# Patient Record
Sex: FEMALE | Race: WHITE | NOT HISPANIC OR LATINO | Employment: FULL TIME | URBAN - METROPOLITAN AREA
[De-identification: names, ages, dates, MRNs, and addresses within clinical notes are randomized per-mention and may not be internally consistent; named-entity substitution may affect disease eponyms.]

---

## 2017-08-14 ENCOUNTER — OFFICE VISIT (OUTPATIENT)
Dept: OBSTETRICS AND GYNECOLOGY | Facility: CLINIC | Age: 27
End: 2017-08-14

## 2017-08-14 VITALS
WEIGHT: 211 LBS | BODY MASS INDEX: 37.39 KG/M2 | DIASTOLIC BLOOD PRESSURE: 84 MMHG | HEIGHT: 63 IN | SYSTOLIC BLOOD PRESSURE: 126 MMHG

## 2017-08-14 DIAGNOSIS — Z13.9 SCREENING: ICD-10-CM

## 2017-08-14 DIAGNOSIS — F32.89 OTHER DEPRESSION: ICD-10-CM

## 2017-08-14 DIAGNOSIS — Z30.09 ENCOUNTER FOR OTHER GENERAL COUNSELING OR ADVICE ON CONTRACEPTION: Primary | ICD-10-CM

## 2017-08-14 PROBLEM — F32.A DEPRESSION: Status: ACTIVE | Noted: 2017-08-14

## 2017-08-14 LAB
B-HCG UR QL: NEGATIVE
BILIRUB BLD-MCNC: ABNORMAL MG/DL
CLARITY, POC: CLEAR
COLOR UR: YELLOW
GLUCOSE UR STRIP-MCNC: NEGATIVE MG/DL
INTERNAL NEGATIVE CONTROL: NEGATIVE
INTERNAL POSITIVE CONTROL: POSITIVE
KETONES UR QL: NEGATIVE
LEUKOCYTE EST, POC: ABNORMAL
Lab: NORMAL
NITRITE UR-MCNC: NEGATIVE MG/ML
PH UR: 5.5 [PH] (ref 5–8)
PROT UR STRIP-MCNC: ABNORMAL MG/DL
RBC # UR STRIP: NEGATIVE /UL
SP GR UR: 1.03 (ref 1–1.03)
UROBILINOGEN UR QL: NORMAL

## 2017-08-14 PROCEDURE — 81002 URINALYSIS NONAUTO W/O SCOPE: CPT | Performed by: OBSTETRICS & GYNECOLOGY

## 2017-08-14 PROCEDURE — 81025 URINE PREGNANCY TEST: CPT | Performed by: OBSTETRICS & GYNECOLOGY

## 2017-08-14 PROCEDURE — 99203 OFFICE O/P NEW LOW 30 MIN: CPT | Performed by: OBSTETRICS & GYNECOLOGY

## 2017-08-14 RX ORDER — FLUOXETINE HYDROCHLORIDE 20 MG/1
20 CAPSULE ORAL DAILY
Qty: 30 CAPSULE | Refills: 5 | Status: SHIPPED | OUTPATIENT
Start: 2017-08-14 | End: 2017-10-24

## 2017-08-14 NOTE — PROGRESS NOTES
"Subjective   Mayte RAMOS is a 26 y.o. female new pt, signed records release in office today and we are waiting to receive them. Has been on Depo Provera, doesn't feel cycles were normal. C/o alot of spotting. - patient has been on Depo last 6 months - wants a different BC - Discussed all options - will try Kyleena - Had pap in jan 2017 - also recently diagnosed with depression - seeing a therapist - would like to try anti-depressant   History of Present Illness    The following portions of the patient's history were reviewed and updated as appropriate: allergies, current medications, past family history, past medical history, past social history, past surgical history and problem list.    Review of Systems   Constitutional: Negative for chills and fever.   Gastrointestinal: Negative for abdominal distention and abdominal pain.   Genitourinary: Positive for menstrual problem and vaginal bleeding. Negative for dysuria, pelvic pain, vaginal discharge and vaginal pain.   Psychiatric/Behavioral: Positive for dysphoric mood. Negative for suicidal ideas. The patient is nervous/anxious.    All other systems reviewed and are negative.  /84  Ht 63\" (160 cm)  Wt 211 lb (95.7 kg)  LMP  (LMP Unknown)  Breastfeeding? No  BMI 37.38 kg/m2      Objective   Physical Exam   Constitutional: She is oriented to person, place, and time. She appears well-developed and well-nourished.   HENT:   Head: Normocephalic and atraumatic.   Pulmonary/Chest: Effort normal.   Neurological: She is alert and oriented to person, place, and time.   Skin: Skin is warm and dry.   Psychiatric: She has a normal mood and affect. Her behavior is normal.   Nursing note and vitals reviewed.        Assessment/Plan   Mayte Browning was seen today for establish care.    Diagnoses and all orders for this visit:    Encounter for other general counseling or advice on contraception    Screening  -     POC Urinalysis Dipstick  -     POC Pregnancy, " Urine    Other depression  -     FLUoxetine (PROZAC) 20 MG capsule; Take 1 capsule by mouth Daily.      Return for kyleena placement   Counseling was given to patient for the following topics: risks and benefits of treatment options and contraceptive options and side effects and depression tx . Total time of the encounter was 30 minutes and 20 minutes was spend counseling.

## 2017-08-14 NOTE — PROGRESS NOTES
Subjective   Mayte RAMOS is a 26 y.o. female new pt, signed records release in office today and we are waiting to receive them. Has been on Depo Provera, doesn't feel cycles were normal. C/o alot of spotting.       History of Present Illness    The following portions of the patient's history were reviewed and updated as appropriate: allergies, current medications, past family history, past medical history, past social history, past surgical history and problem list.    Review of Systems    Objective   Physical Exam      Assessment/Plan   Mayte Browning was seen today for establish care.    Diagnoses and all orders for this visit:    Screening  -     POC Urinalysis Dipstick  -     POC Pregnancy, Urine

## 2017-09-19 ENCOUNTER — PROCEDURE VISIT (OUTPATIENT)
Dept: OBSTETRICS AND GYNECOLOGY | Facility: CLINIC | Age: 27
End: 2017-09-19

## 2017-09-19 VITALS
DIASTOLIC BLOOD PRESSURE: 76 MMHG | SYSTOLIC BLOOD PRESSURE: 118 MMHG | BODY MASS INDEX: 37.03 KG/M2 | WEIGHT: 209 LBS | HEIGHT: 63 IN

## 2017-09-19 DIAGNOSIS — B96.89 BV (BACTERIAL VAGINOSIS): ICD-10-CM

## 2017-09-19 DIAGNOSIS — F32.89 OTHER DEPRESSION: ICD-10-CM

## 2017-09-19 DIAGNOSIS — N76.0 BV (BACTERIAL VAGINOSIS): ICD-10-CM

## 2017-09-19 DIAGNOSIS — Z13.9 SCREENING: ICD-10-CM

## 2017-09-19 DIAGNOSIS — Z30.430 ENCOUNTER FOR INSERTION OF INTRAUTERINE CONTRACEPTIVE DEVICE: Primary | ICD-10-CM

## 2017-09-19 PROBLEM — R87.612 LGSIL ON PAP SMEAR OF CERVIX: Status: ACTIVE | Noted: 2017-09-19

## 2017-09-19 LAB
B-HCG UR QL: NEGATIVE
BILIRUB BLD-MCNC: NEGATIVE MG/DL
CLARITY, POC: CLEAR
COLOR UR: YELLOW
GLUCOSE UR STRIP-MCNC: NEGATIVE MG/DL
INTERNAL NEGATIVE CONTROL: NEGATIVE
INTERNAL POSITIVE CONTROL: POSITIVE
KETONES UR QL: NEGATIVE
LEUKOCYTE EST, POC: ABNORMAL
Lab: NORMAL
NITRITE UR-MCNC: NEGATIVE MG/ML
PH UR: 6 [PH] (ref 5–8)
PROT UR STRIP-MCNC: ABNORMAL MG/DL
RBC # UR STRIP: NEGATIVE /UL
SP GR UR: 1.02 (ref 1–1.03)
UROBILINOGEN UR QL: NORMAL

## 2017-09-19 PROCEDURE — 81025 URINE PREGNANCY TEST: CPT | Performed by: OBSTETRICS & GYNECOLOGY

## 2017-09-19 PROCEDURE — 99212 OFFICE O/P EST SF 10 MIN: CPT | Performed by: OBSTETRICS & GYNECOLOGY

## 2017-09-19 PROCEDURE — 81002 URINALYSIS NONAUTO W/O SCOPE: CPT | Performed by: OBSTETRICS & GYNECOLOGY

## 2017-09-19 PROCEDURE — 58300 INSERT INTRAUTERINE DEVICE: CPT | Performed by: OBSTETRICS & GYNECOLOGY

## 2017-09-19 NOTE — PROGRESS NOTES
Procedure   Procedures    IUD Insertion Procedure Note    Pre-operative Diagnosis: Contraception     Post-operative Diagnosis: same    Indications: contraception    Procedure Details   Urine pregnancy test was done and result was neg.  The risks (including infection, bleeding, pain, and uterine perforation) and benefits of the procedure were explained to the patient and Written informed consent was obtained.      Cervix cleansed with Betadine. Uterus sounded to 7 cm. IUD inserted without difficulty. String visible and trimmed. Patient tolerated procedure well.    IUD Information:  Lot # [id number], Expiration date 12/18, [place].    Condition:  Stable    Complications:  None    Plan:    The patient was advised to call for any fever or for prolonged or severe pain or bleeding. She was advised to use NSAID as needed for mild to moderate pain.     Attending Physician Documentation:  I was present for or participated in the entire procedure, including opening and closing.

## 2017-09-19 NOTE — PROGRESS NOTES
"Subjective   Mayte Boyce is a 27 y.o. female who presents for kyleena brochure - states prozac is helping but can't sleep - works 3rd shift - told her to switch to night before she goes to work -  Had LSIL pap 1/2017 and colpo showing mild dysplasia in Feb 2017     History of Present Illness    The following portions of the patient's history were reviewed and updated as appropriate: allergies, current medications, past family history, past medical history, past social history, past surgical history and problem list.    Review of Systems   Constitutional: Negative for chills, fatigue and fever.   Gastrointestinal: Negative for abdominal distention and abdominal pain.   Genitourinary: Negative for dyspareunia, dysuria, menstrual problem, pelvic pain, vaginal bleeding, vaginal discharge and vaginal pain.   All other systems reviewed and are negative.  /76  Ht 63\" (160 cm)  Wt 209 lb (94.8 kg)  LMP  (LMP Unknown)  Breastfeeding? No Comment: was using depo  BMI 37.02 kg/m2      Objective   Physical Exam   Constitutional: She is oriented to person, place, and time. She appears well-developed and well-nourished.   Pulmonary/Chest: Effort normal.   Neurological: She is alert and oriented to person, place, and time.   Skin: Skin is warm and dry.   Psychiatric: She has a normal mood and affect. Her behavior is normal.   Nursing note and vitals reviewed.      Assessment/Plan   Mayte Miller was seen today for procedure and follow-up.    Diagnoses and all orders for this visit:    Encounter for insertion of intrauterine contraceptive device  -     levonorgestrel (KYLEENA) 19.5 MG IUD 1 each; 1 each by Intrauterine route 1 (One) Time.    Screening  -     POC Urinalysis Dipstick  -     POC Pregnancy, Urine    Other depression       Return 4 weeks string check          "

## 2017-09-22 ENCOUNTER — TELEPHONE (OUTPATIENT)
Dept: OBSTETRICS AND GYNECOLOGY | Facility: CLINIC | Age: 27
End: 2017-09-22

## 2017-09-22 LAB
A VAGINAE DNA VAG QL NAA+PROBE: ABNORMAL SCORE
BVAB2 DNA VAG QL NAA+PROBE: ABNORMAL SCORE
C ALBICANS DNA VAG QL NAA+PROBE: NEGATIVE
C GLABRATA DNA VAG QL NAA+PROBE: NEGATIVE
C TRACH RRNA SPEC QL NAA+PROBE: NEGATIVE
MEGA1 DNA VAG QL NAA+PROBE: ABNORMAL SCORE
N GONORRHOEA RRNA SPEC QL NAA+PROBE: NEGATIVE
T VAGINALIS RRNA SPEC QL NAA+PROBE: NEGATIVE

## 2017-09-22 RX ORDER — METRONIDAZOLE 500 MG/1
500 TABLET ORAL 2 TIMES DAILY
Qty: 14 TABLET | Refills: 0 | Status: SHIPPED | OUTPATIENT
Start: 2017-09-22 | End: 2017-09-29

## 2017-09-22 NOTE — TELEPHONE ENCOUNTER
----- Message from Elva Henry MD sent at 9/22/2017 11:22 AM EDT -----  Please call patient and notify of BV infection - Rx sent to pharmacy

## 2017-09-22 NOTE — TELEPHONE ENCOUNTER
Left message to  rx at pharmacy, however, need to call back on Monday for more information.  Ask for Rody Rondon.

## 2017-09-25 ENCOUNTER — TELEPHONE (OUTPATIENT)
Dept: OBSTETRICS AND GYNECOLOGY | Facility: CLINIC | Age: 27
End: 2017-09-25

## 2017-10-24 ENCOUNTER — OFFICE VISIT (OUTPATIENT)
Dept: OBSTETRICS AND GYNECOLOGY | Facility: CLINIC | Age: 27
End: 2017-10-24

## 2017-10-24 VITALS
HEIGHT: 63 IN | SYSTOLIC BLOOD PRESSURE: 120 MMHG | DIASTOLIC BLOOD PRESSURE: 82 MMHG | BODY MASS INDEX: 37.21 KG/M2 | WEIGHT: 210 LBS

## 2017-10-24 DIAGNOSIS — Z13.89 SCREENING FOR BLOOD OR PROTEIN IN URINE: ICD-10-CM

## 2017-10-24 DIAGNOSIS — F32.89 OTHER DEPRESSION: ICD-10-CM

## 2017-10-24 DIAGNOSIS — Z30.431 ENCOUNTER FOR ROUTINE CHECKING OF INTRAUTERINE CONTRACEPTIVE DEVICE (IUD): Primary | ICD-10-CM

## 2017-10-24 PROBLEM — R87.612 LGSIL ON PAP SMEAR OF CERVIX: Status: RESOLVED | Noted: 2017-09-19 | Resolved: 2017-10-24

## 2017-10-24 PROBLEM — N87.0 CIN I (CERVICAL INTRAEPITHELIAL NEOPLASIA I): Status: ACTIVE | Noted: 2017-10-24

## 2017-10-24 LAB
BILIRUB BLD-MCNC: ABNORMAL MG/DL
CLARITY, POC: CLEAR
COLOR UR: YELLOW
GLUCOSE UR STRIP-MCNC: NEGATIVE MG/DL
KETONES UR QL: NEGATIVE
LEUKOCYTE EST, POC: ABNORMAL
NITRITE UR-MCNC: NEGATIVE MG/ML
PH UR: 5.5 [PH] (ref 5–8)
PROT UR STRIP-MCNC: ABNORMAL MG/DL
RBC # UR STRIP: ABNORMAL /UL
SP GR UR: 1.02 (ref 1–1.03)
UROBILINOGEN UR QL: NORMAL

## 2017-10-24 PROCEDURE — 99213 OFFICE O/P EST LOW 20 MIN: CPT | Performed by: OBSTETRICS & GYNECOLOGY

## 2017-10-24 PROCEDURE — 81002 URINALYSIS NONAUTO W/O SCOPE: CPT | Performed by: OBSTETRICS & GYNECOLOGY

## 2017-10-24 NOTE — PROGRESS NOTES
"Subjective   Mayte Boyce is a 27 y.o. female Kyleena string check. f/u on depression. Does not feel like prozac is working anymore and would like to try something different  - will switch to zoloft   History of Present Illness    The following portions of the patient's history were reviewed and updated as appropriate: allergies, current medications, past family history, past medical history, past social history, past surgical history and problem list.    Review of Systems   Constitutional: Negative for chills, fatigue and fever.   Gastrointestinal: Negative for abdominal distention and abdominal pain.   Genitourinary: Negative for dysuria, menstrual problem, pelvic pain, vaginal bleeding, vaginal discharge and vaginal pain.   Psychiatric/Behavioral: Positive for agitation and dysphoric mood. Negative for suicidal ideas.   All other systems reviewed and are negative.  /82  Ht 63\" (160 cm)  Wt 210 lb (95.3 kg)  Breastfeeding? No  BMI 37.2 kg/m2      Objective   Physical Exam   Constitutional: She is oriented to person, place, and time. She appears well-developed and well-nourished.   Pulmonary/Chest: Effort normal.   Genitourinary: Vagina normal.   Genitourinary Comments: IUD strings visualized at os    Neurological: She is alert and oriented to person, place, and time.   Skin: Skin is warm and dry.   Psychiatric: She has a normal mood and affect. Her behavior is normal.   Nursing note and vitals reviewed.      Assessment/Plan   Mayte Miller was seen today for follow-up.    Diagnoses and all orders for this visit:    Encounter for routine checking of intrauterine contraceptive device (IUD)    Screening for blood or protein in urine  -     POC Urinalysis Dipstick    Other depression  -     sertraline (ZOLOFT) 50 MG tablet; Take 1 tablet by mouth Daily.                "

## 2018-03-15 ENCOUNTER — OFFICE VISIT (OUTPATIENT)
Dept: OBSTETRICS AND GYNECOLOGY | Age: 28
End: 2018-03-15

## 2018-03-15 VITALS
WEIGHT: 221 LBS | DIASTOLIC BLOOD PRESSURE: 84 MMHG | HEIGHT: 63 IN | SYSTOLIC BLOOD PRESSURE: 120 MMHG | BODY MASS INDEX: 39.16 KG/M2

## 2018-03-15 DIAGNOSIS — Z01.419 WELL WOMAN EXAM WITH ROUTINE GYNECOLOGICAL EXAM: Primary | ICD-10-CM

## 2018-03-15 DIAGNOSIS — N89.8 VAGINAL DISCHARGE: ICD-10-CM

## 2018-03-15 PROCEDURE — 99395 PREV VISIT EST AGE 18-39: CPT | Performed by: NURSE PRACTITIONER

## 2018-03-15 RX ORDER — DIPHENHYDRAMINE HCL 25 MG
25 CAPSULE ORAL NIGHTLY PRN
COMMUNITY
End: 2019-04-18

## 2018-03-15 NOTE — PROGRESS NOTES
[facility]  Routine Annual Visit    3/15/2018    Patient: Mayte Boyce          MR#:[id number]      History of Present Illness    27 y.o. female  who presents for annual exam. Last pap 2017 showed low grade dysplasia- this was followed by lesley which confirmed mild dysplasia only. Pap smear will be repeated today. Mayte Miller is using kyleena for contraception- inserted September. She reports amenorrhea for the most part on the IUD. She has struggled with depression for years and has tried several medications but has never felt depression adequately controlled. Was seeing a therapist but has not for several months. No SI.      No LMP recorded (lmp unknown). Patient has had an implant.  Obstetric History:  OB History      Para Term  AB Living    0 0 0 0 0 0    SAB TAB Ectopic Molar Multiple Live Births    0 0 0  0          Menstrual History:     No LMP recorded (lmp unknown). Patient has had an implant.       Sexual History:       ________________________________________  Patient Active Problem List   Diagnosis   • Depression   • KEAGAN I (cervical intraepithelial neoplasia I)       Past Medical History:   Diagnosis Date   • Abnormal Pap smear of cervix    • Anxiety    • Cervical dysplasia    • KEAGAN I (cervical intraepithelial neoplasia I) 10/24/2017   • Depression        Past Surgical History:   Procedure Laterality Date   • COLPOSCOPY  2017   • WISDOM TOOTH EXTRACTION         History   Smoking Status   • Current Some Day Smoker   Smokeless Tobacco   • Never Used       Family History   Problem Relation Age of Onset   • Diabetes Father      type 1   • Kidney failure Father      DIALYSIS   • Diabetes Mother      type 2   • Heart disease Paternal Grandfather    • Breast cancer Neg Hx    • Uterine cancer Neg Hx    • Endometrial cancer Neg Hx    • Cervical cancer Neg Hx    • Ovarian cancer Neg Hx    • Prostate cancer Neg Hx    • Rectal cancer Neg Hx    • Colon cancer  Neg Hx        Prior to Admission medications    Medication Sig Start Date End Date Taking? Authorizing Provider   diphenhydrAMINE (BENADRYL) 25 mg capsule Take 25 mg by mouth At Night As Needed for Itching.   Yes Historical Provider, MD   IBUPROFEN PO Take  by mouth As Needed.   Yes Historical Provider, MD   levonorgestrel (KYLEENA) 19.5 MG intrauterine device IUD 1 each by Intrauterine route 1 (One) Time.   Yes Historical Provider, MD   Doxylamine Succinate, Sleep, (SLEEP AID PO) Take  by mouth As Needed.  3/15/18 Yes Historical Provider, MD   loratadine-pseudoephedrine (CLARITIN-D 12-hour) 5-120 MG per 12 hr tablet Take 1 tablet by mouth 2 (Two) Times a Day. 2/15/18 3/15/18  BETTY Christianson   sertraline (ZOLOFT) 50 MG tablet Take 1 tablet by mouth Daily. 10/24/17 3/15/18  Elva Henry MD     ________________________________________    Current contraception: IUD  History of abnormal Pap smear: yes - low grade 2017  Family history of uterine or ovarian cancer: no  Family History of colon cancer/colon polyps: no  History of abnormal mammogram: no  History of abnormal lipids: no    The following portions of the patient's history were reviewed and updated as appropriate: allergies, current medications, past family history, past medical history, past social history, past surgical history and problem list.  .    Review of Systems   Constitutional: Negative.    HENT: Negative.    Eyes: Negative for visual disturbance.   Respiratory: Negative for cough, shortness of breath and wheezing.    Cardiovascular: Negative for chest pain, palpitations and leg swelling.   Gastrointestinal: Negative for abdominal distention, abdominal pain, blood in stool, constipation, diarrhea, nausea and vomiting.   Endocrine: Negative for cold intolerance and heat intolerance.   Genitourinary: Negative for difficulty urinating, dyspareunia, dysuria, frequency, genital sores, hematuria, menstrual problem, pelvic pain, urgency, vaginal  "bleeding, vaginal discharge and vaginal pain.   Musculoskeletal: Negative.    Skin: Negative.    Neurological: Negative for dizziness, weakness, light-headedness, numbness and headaches.   Hematological: Negative.    Psychiatric/Behavioral: Negative.    Breasts: negative for lumps skin changes, dimpling, swelling, nipple changes/discharge bilaterally       Objective   Physical Exam    /84   Ht 160 cm (63\")   Wt 100 kg (221 lb)   LMP  (LMP Unknown) Comment: Kyleena  Breastfeeding? No   BMI 39.15 kg/m²    BP Readings from Last 3 Encounters:   03/15/18 120/84   02/15/18 115/80   12/10/17 100/62      Wt Readings from Last 3 Encounters:   03/15/18 100 kg (221 lb)   12/10/17 95.3 kg (210 lb)   11/24/17 95.3 kg (210 lb)        BMI: Estimated body mass index is 39.15 kg/m² as calculated from the following:    Height as of this encounter: 160 cm (63\").    Weight as of this encounter: 100 kg (221 lb).      General:   alert, appears stated age and cooperative   Heart: regular rate and rhythm, S1, S2 normal, no murmur, click, rub or gallop   Lungs: clear to auscultation bilaterally   Abdomen: soft, non-tender, without masses or organomegaly   Breast: inspection negative, no nipple discharge or bleeding, no masses or nodularity palpable   Vulva: normal   Vagina: normal mucosa, thin grey discharge   Cervix: no cervical motion tenderness, no lesions and IUD strings noted at os   Uterus: normal size, mobile, non-tender or normal shape and consistency   Adnexa: exam limited by habitus     Assessment:    1. Normal annual exam   2. Contraception- kyleena  3. Depression (chronic) - contact information given to pt for [facility]  4.  Counseled on healthy lifestyle modifications, safe sex, condom use, and self breast exams. Counseled regarding family hx diabetes- highly encouraged weight loss      Plan:    []  Rx:   []  Mammogram request made  [x]  PAP done  []  Occult fecal blood test ([facility])  []  Labs: "   [x]  GC/Chl/TV  []  DEXA scan   []  Referral for colonoscopy:           Catrina Velazquez, APRN  3/15/2018 9:12 AM

## 2018-03-18 LAB
A VAGINAE DNA VAG QL NAA+PROBE: NORMAL SCORE
BVAB2 DNA VAG QL NAA+PROBE: NORMAL SCORE
C ALBICANS DNA VAG QL NAA+PROBE: NEGATIVE
C GLABRATA DNA VAG QL NAA+PROBE: NEGATIVE
C TRACH RRNA SPEC QL NAA+PROBE: NEGATIVE
MEGA1 DNA VAG QL NAA+PROBE: NORMAL SCORE
N GONORRHOEA RRNA SPEC QL NAA+PROBE: NEGATIVE
T VAGINALIS RRNA SPEC QL NAA+PROBE: NEGATIVE

## 2018-03-19 LAB
CONV .: NORMAL
CYTOLOGIST CVX/VAG CYTO: NORMAL
CYTOLOGY CVX/VAG DOC THIN PREP: NORMAL
DX ICD CODE: NORMAL
HIV 1 & 2 AB SER-IMP: NORMAL
OTHER STN SPEC: NORMAL
PATH REPORT.FINAL DX SPEC: NORMAL
STAT OF ADQ CVX/VAG CYTO-IMP: NORMAL

## 2018-03-21 ENCOUNTER — TELEPHONE (OUTPATIENT)
Dept: OBSTETRICS AND GYNECOLOGY | Age: 28
End: 2018-03-21

## 2018-03-21 NOTE — TELEPHONE ENCOUNTER
----- Message from BETTY Crook sent at 3/21/2018  9:18 AM EDT -----  Please inform patient her pap smear returned negative (normal). Thank you.

## 2020-12-15 ENCOUNTER — TELEPHONE (OUTPATIENT)
Dept: URGENT CARE | Facility: CLINIC | Age: 30
End: 2020-12-15